# Patient Record
Sex: FEMALE | Race: BLACK OR AFRICAN AMERICAN
[De-identification: names, ages, dates, MRNs, and addresses within clinical notes are randomized per-mention and may not be internally consistent; named-entity substitution may affect disease eponyms.]

---

## 2017-04-05 ENCOUNTER — HOSPITAL ENCOUNTER (EMERGENCY)
Dept: HOSPITAL 74 - JER | Age: 27
Discharge: HOME | End: 2017-04-05
Payer: COMMERCIAL

## 2017-04-05 VITALS — DIASTOLIC BLOOD PRESSURE: 78 MMHG | HEART RATE: 83 BPM | SYSTOLIC BLOOD PRESSURE: 139 MMHG

## 2017-04-05 VITALS — TEMPERATURE: 98.3 F

## 2017-04-05 VITALS — BODY MASS INDEX: 30.9 KG/M2

## 2017-04-05 DIAGNOSIS — K59.09: Primary | ICD-10-CM

## 2017-04-05 LAB
APPEARANCE UR: (no result)
BILIRUB UR STRIP.AUTO-MCNC: NEGATIVE MG/DL
COLOR UR: YELLOW
KETONES UR QL STRIP: NEGATIVE
LEUKOCYTE ESTERASE UR QL STRIP.AUTO: (no result)
MUCOUS THREADS URNS QL MICRO: (no result)
NITRITE UR QL STRIP: NEGATIVE
PH UR: 6 [PH] (ref 5–8)
PROT UR QL STRIP: NEGATIVE
PROT UR QL STRIP: NEGATIVE
RBC # BLD AUTO: 2 /HPF (ref 0–3)
RBC # UR STRIP: NEGATIVE /UL
SP GR UR: 1.02 (ref 1–1.03)
UROBILINOGEN UR STRIP-MCNC: NEGATIVE E.U./DL (ref 0.2–1)
WBC # UR AUTO: 15 /HPF (ref 3–5)

## 2017-04-05 PROCEDURE — 3E0233Z INTRODUCTION OF ANTI-INFLAMMATORY INTO MUSCLE, PERCUTANEOUS APPROACH: ICD-10-PCS

## 2017-04-05 NOTE — PDOC
History of Present Illness





- General


Chief Complaint: Pain


Stated Complaint: ABD PAIN, VOMITING


Time Seen by Provider: 04/05/17 16:51


History Source: Patient


Exam Limitations: No Limitations





- History of Present Illness


Travel History: No


Initial Comments: 


04/05/17 17:04


26 yr female with c/o stomach pain for 2 years since having gallbladder 

removed. Pt states she has followed up with GI  and has had endoscopy 

and colonoscopy that was negative. Pt has been seen at multiple hospital ER's 

for the pain. Pt states she has chronic back pain followed by pain management 

takes percocet. Last BM 3 days ago . no diarrhea no vomiting or fever. no 

aggrevating factors. Pt denies urinary complaints.  





04/05/17 17:46





Timing/Duration: reports: constant (2 years)


Quality: reports: moderate


Abdominal Pain Onset Location: reports: epigastric, generalized abdomen


Pain Radiation: reports: epigastric





Past History





- Past Medical History


Allergies/Adverse Reactions: 


 Allergies











Allergy/AdvReac Type Severity Reaction Status Date / Time


 


No Known Drug Allergies Allergy   Verified 04/05/17 14:18











Home Medications: 


Ambulatory Orders





Cyclobenzaprine HCl [Flexeril 10 mg] 10 mg PO BID PRN 04/05/17 


Oxycodone HCl/Acetaminophen [Endocet 7.5-325 mg Tablet] 1 each PO PRN PRN 04/05/ 17 


Polyethylene Glycol 3350 [Miralax (For Bowel Prep) -] 17 gm PO DAILY #1 bottle 

04/05/17 








Anemia: No


Asthma: No


Cancer: No


Cardiac Disorders: No


CVA: No


COPD: No


CHF: No


Dementia: No


Diabetes: No


GI Disorders: No


 Disorders: No


HTN: No


Hypercholesterolemia: No


Liver Disease: No


Seizures: No


Thyroid Disease: No


Other medical history: chronic low back pain on percocet 





- Surgical History


Cholecystectomy: Yes





- Immunization History


Td Vaccination: Yes


Immunization Up to Date: Yes





- Psycho/Social/Smoking Cessation Hx


Anxiety: No


Suicidal Ideation: No


Smoking Status: No


Smoking History: Never smoked


Have you smoked in the past 12 months: No


Number of Cigarettes Smoked Daily: 0


Hx Alcohol Use: Yes (SOCIAL)


Drug/Substance Use Hx: No


Substance Use Type: None


Hx Substance Use Treatment: No





Abd/GI Specific PMHX





- Complaint Specific PMHX


Gall Bladder Disease: Yes





**Review of Systems





- Review of Systems


Able to Perform ROS?: Yes


Is the patient limited English proficient: No


Constitutional: No: Symptoms Reported


HEENTM: No: Symptoms Reported


Respiratory: No: Symptoms reported


Cardiac (ROS): No: Symptoms Reported


ABD/GI: Yes: See HPI


: No: Symptoms Reported


Musculoskeletal: No: Symptoms Reported


Integumentary: No: Symptoms Reported


Neurological: No: Symptoms reported





*Physical Exam





- Vital Signs


 Last Vital Signs











Temp Pulse Resp BP Pulse Ox


 


 98.3 F   100 H  20   143/91   100 


 


 04/05/17 14:14  04/05/17 14:14  04/05/17 14:14  04/05/17 14:14  04/05/17 14:14














- Physical Exam


General Appearance: Yes: Nourished, Appropriately Dressed


HEENT: positive: EOMI, NINI, Normal ENT Inspection, TMs Normal, Pharynx Normal


Neck: positive: Supple.  negative: Tender


Respiratory/Chest: positive: Lungs Clear, Normal Breath Sounds


Cardiovascular: positive: Regular Rhythm, Regular Rate


Gastrointestinal/Abdominal: positive: Normal Bowel Sounds, Tender (epigastric )

, Flat


Musculoskeletal: positive: Normal Inspection


Extremity: positive: Normal Capillary Refill, Normal Inspection, Normal Range 

of Motion


Integumentary: positive: Normal Color, Dry, Warm


Neurologic: positive: Fully Oriented, Alert, Normal Mood/Affect, Normal Response

, Motor Strength 5/5





Medical Decision Making





- Medical Decision Making





04/05/17 17:08


cc: abd pain for 2 years since having gallbladder removed


neg fever chills no diarrhea


pt has seen  and has visited multiple hospital ER's and has had 

negative findings. Pt states last week at [facility] had cat scan of abdomen and MRI 

which was negative. 





*DC/Admit/Observation/Transfer


Diagnosis at time of Disposition: 


Constipation


Qualifiers:


 Constipation type: other constipation type Qualified Code(s): K59.09 - Other 

constipation





- Discharge Dispostion


Condition at time of disposition: Good





- Prescriptions


Prescriptions: 


Polyethylene Glycol 3350 [Miralax (For Bowel Prep) -] 17 gm PO DAILY #1 bottle





- Referrals


Referrals: 


Edwin Wilson MD [Primary Care Provider] - 





- Patient Instructions


Additional Instructions: 


drink at least 2-3 liters of water a day 


take miralax as directed for constipation


take tylenol with maalox for pain (over the counter) 





avoid narcotics for pain as this will make constipation worse


follow with your gastroenterolgist  or you can call the back of your 

insurance cared to get a name of a gastroenterologist that takes your insurance

## 2018-04-10 ENCOUNTER — HOSPITAL ENCOUNTER (EMERGENCY)
Dept: HOSPITAL 74 - JER | Age: 28
Discharge: HOME | End: 2018-04-10
Payer: COMMERCIAL

## 2018-04-10 VITALS — HEART RATE: 87 BPM | DIASTOLIC BLOOD PRESSURE: 79 MMHG | TEMPERATURE: 98.5 F | SYSTOLIC BLOOD PRESSURE: 143 MMHG

## 2018-04-10 VITALS — BODY MASS INDEX: 30.9 KG/M2

## 2018-04-10 DIAGNOSIS — O26.891: Primary | ICD-10-CM

## 2018-04-10 DIAGNOSIS — Z3A.01: ICD-10-CM

## 2018-04-10 DIAGNOSIS — O23.31: ICD-10-CM

## 2018-04-10 DIAGNOSIS — R51: ICD-10-CM

## 2018-04-10 LAB
ALBUMIN SERPL-MCNC: 3.7 G/DL (ref 3.4–5)
ALP SERPL-CCNC: 60 U/L (ref 45–117)
ALT SERPL-CCNC: 28 U/L (ref 12–78)
ANION GAP SERPL CALC-SCNC: 10 MMOL/L (ref 8–16)
APPEARANCE UR: (no result)
AST SERPL-CCNC: 17 U/L (ref 15–37)
BASOPHILS # BLD: 0.2 % (ref 0–2)
BILIRUB SERPL-MCNC: 0.4 MG/DL (ref 0.2–1)
BILIRUB UR STRIP.AUTO-MCNC: NEGATIVE MG/DL
BUN SERPL-MCNC: 7 MG/DL (ref 7–18)
CALCIUM SERPL-MCNC: 9.4 MG/DL (ref 8.5–10.1)
CHLORIDE SERPL-SCNC: 105 MMOL/L (ref 98–107)
CO2 SERPL-SCNC: 24 MMOL/L (ref 21–32)
COLOR UR: YELLOW
CREAT SERPL-MCNC: 0.6 MG/DL (ref 0.55–1.02)
DEPRECATED RDW RBC AUTO: 13.8 % (ref 11.6–15.6)
EOSINOPHIL # BLD: 0.9 % (ref 0–4.5)
EPITH CASTS URNS QL MICRO: (no result) /HPF
GLUCOSE SERPL-MCNC: 95 MG/DL (ref 74–106)
HCG UR QL: POSITIVE
HCT VFR BLD CALC: 30.9 % (ref 32.4–45.2)
HGB BLD-MCNC: 10.4 GM/DL (ref 10.7–15.3)
KETONES UR QL STRIP: NEGATIVE
LEUKOCYTE ESTERASE UR QL STRIP.AUTO: (no result)
LIPASE SERPL-CCNC: 74 U/L (ref 73–393)
LYMPHOCYTES # BLD: 48.1 % (ref 8–40)
MCH RBC QN AUTO: 32.7 PG (ref 25.7–33.7)
MCHC RBC AUTO-ENTMCNC: 33.8 G/DL (ref 32–36)
MCV RBC: 96.8 FL (ref 80–96)
MONOCYTES # BLD AUTO: 7.1 % (ref 3.8–10.2)
MUCOUS THREADS URNS QL MICRO: (no result)
NEUTROPHILS # BLD: 43.7 % (ref 42.8–82.8)
NITRITE UR QL STRIP: NEGATIVE
PH UR: 7 [PH] (ref 5–8)
PLATELET # BLD AUTO: 379 K/MM3 (ref 134–434)
PMV BLD: 8.3 FL (ref 7.5–11.1)
POTASSIUM SERPLBLD-SCNC: 4.3 MMOL/L (ref 3.5–5.1)
PROT SERPL-MCNC: 8.1 G/DL (ref 6.4–8.2)
PROT UR QL STRIP: NEGATIVE
PROT UR QL STRIP: NEGATIVE
RBC # BLD AUTO: 3.19 M/MM3 (ref 3.6–5.2)
RBC # UR STRIP: NEGATIVE /UL
SODIUM SERPL-SCNC: 139 MMOL/L (ref 136–145)
SP GR UR: 1.02 (ref 1–1.03)
UROBILINOGEN UR STRIP-MCNC: NEGATIVE MG/DL (ref 0.2–1)
WBC # BLD AUTO: 8.6 K/MM3 (ref 4–10)

## 2018-04-10 PROCEDURE — 3E0337Z INTRODUCTION OF ELECTROLYTIC AND WATER BALANCE SUBSTANCE INTO PERIPHERAL VEIN, PERCUTANEOUS APPROACH: ICD-10-PCS

## 2018-04-10 PROCEDURE — 3E033GC INTRODUCTION OF OTHER THERAPEUTIC SUBSTANCE INTO PERIPHERAL VEIN, PERCUTANEOUS APPROACH: ICD-10-PCS

## 2018-04-10 PROCEDURE — 3E033NZ INTRODUCTION OF ANALGESICS, HYPNOTICS, SEDATIVES INTO PERIPHERAL VEIN, PERCUTANEOUS APPROACH: ICD-10-PCS

## 2018-04-10 NOTE — PDOC
Attending Attestation





- Resident


Resident Name: Chas Banks





- ED Attending Attestation


I have performed the following: I have examined & evaluated the patient, The 

case was reviewed & discussed with the resident, I agree w/resident's findings 

& plan, Exceptions are as noted





- HPI


HPI: 





04/10/18 10:49





28 yo F c/ hx of cholecystectomy, obesity p/w headache and epigastric pain.


The patient reported 3 weeks of intermittent global tension like headache with 

associated occasional photophobia.


Denies fevers, chills, neck stiffness.


States that the headache comes and goes without a known exacerbating or 

improving factors.


States that the headache makes her feel nauseous and makes her vomit.


The headache worsens her chronic epigastric abdominal pain, which she has had 

prior abdominal imaging and endoscopies.


The patient has been taking GERD medications with not much relief.


States that the headache was worsening so came into the ED.


Never had a prior history of migraines.





- Physicial Exam


PE: 





04/10/18 10:52





GENERAL: Awake, alert, and fully oriented, in no acute distress. +obese


HEAD: No signs of trauma


EYES: PERRLA, EOMI, sclera anicteric, conjunctiva clear


ENT: Auricles normal inspection, hearing grossly normal, nares patent 


NECK: Normal ROM, supple


LUNGS: Breath sounds equal, clear to auscultation bilaterally.  No wheezes, and 

no crackles


HEART: Regular rate and rhythm, normal S1 and S2, no murmurs, rubs or gallops


ABDOMEN: Soft, TTP LUQ.  No guarding, no rebound.  No masses


EXTREMITIES: Normal range of motion, no edema.  No clubbing or cyanosis. No 

cords, erythema, or tenderness


NEUROLOGICAL: Cranial nerves II through XII grossly intact.  Normal speech


SKIN: Warm, Dry, normal turgor, no rashes or lesions noted.





- Medical Decision Making





04/10/18 10:52





 Vital Signs











Temp Pulse Resp BP Pulse Ox


 


 98.5 F   87   18   143/79   100 


 


 04/10/18 10:01  04/10/18 10:01  04/10/18 10:01  04/10/18 10:01  04/10/18 10:01








28 yo F p/w headache x 3 weeks.


The characteristic of the headache is possibly secondary to migraines, or even 

pseudotumor cerebri.


However, patient has no visual acuity changes.


Obtain head CT to r/o other etiologies for headache i.e. large masses.


Will treat as migraines for now with medications.


The patient has had chronic abdominal pain under evaluation by Dr. Roy.


Will obtain labs to r/o abdominal etiologies such as pancreatitis, retained 

gallstones, etc.


Treat as GERD for now and reassess.


If the headache improves, and the workup is unrevealing, patient can be 

discharged for outpatient management (including neurology outpatient follow up).


04/10/18 12:16





Urine pregnancy test positive.


Given these circumstances, will likely defer on head CT given the risks 

outweight benefits.


This is a headache that has lasted for several weeks, which can be evaluated 

further by neurology as an outpatient.


Will pursue with pelvic ultrasound. Pt denies vaginal bleeding at this time.


04/10/18 13:20





Single live intrauterine pregnancy 17 wks 5 days. Small subchorionic hemorrage. 

Possible complex/hemorrhagic corpus luteum cyst.





Will check type and screen for RH factor.





 CBC, BMP





 04/10/18 11:30 





 04/10/18 11:30 





 CMP











Sodium  139 mmol/L (136-145)   04/10/18  11:30    


 


Potassium  4.3 mmol/L (3.5-5.1)   04/10/18  11:30    


 


Chloride  105 mmol/L ()   04/10/18  11:30    


 


Carbon Dioxide  24 mmol/L (21-32)   04/10/18  11:30    


 


Anion Gap  10  (8-16)   04/10/18  11:30    


 


BUN  7 mg/dL (7-18)   04/10/18  11:30    


 


Creatinine  0.6 mg/dL (0.55-1.02)   04/10/18  11:30    


 


Creat Clearance w eGFR  > 60  (>60)   04/10/18  11:30    


 


Random Glucose  95 mg/dL ()   04/10/18  11:30    


 


Calcium  9.4 mg/dL (8.5-10.1)   04/10/18  11:30    


 


Total Bilirubin  0.4 mg/dL (0.2-1.0)   04/10/18  11:30    


 


AST  17 U/L (15-37)   04/10/18  11:30    


 


ALT  28 U/L (12-78)   04/10/18  11:30    


 


Alkaline Phosphatase  60 U/L ()   04/10/18  11:30    


 


Total Protein  8.1 g/dl (6.4-8.2)   04/10/18  11:30    


 


Albumin  3.7 g/dl (3.4-5.0)   04/10/18  11:30    


 


Lipase  74 U/L ()   04/10/18  11:30    


 


Serum Pregnancy, Qual  Cancelled   04/10/18  11:30    








 Urine Test Results











Urine Color  Yellow   04/10/18  11:30    


 


Urine Appearance  Slcloudy   04/10/18  11:30    


 


Urine pH  7.0  (5.0-8.0)   04/10/18  11:30    


 


Ur Specific Gravity  1.019  (1.001-1.035)   04/10/18  11:30    


 


Urine Protein  Negative  (NEGATIVE)   04/10/18  11:30    


 


Urine Glucose (UA)  Negative  (NEGATIVE)   04/10/18  11:30    


 


Urine Ketones  Negative  (NEGATIVE)   04/10/18  11:30    


 


Urine Blood  Negative  (NEGATIVE)   04/10/18  11:30    


 


Urine Nitrite  Negative  (NEGATIVE)   04/10/18  11:30    


 


Urine Bilirubin  Negative  (<2.0 mg/dL)   04/10/18  11:30    


 


Ur Leukocyte Esterase  Trace  (NEGATIVE)   04/10/18  11:30    


 


Ur Epithelial Cells  Moderate /HPF (FEW)   04/10/18  11:30    


 


Urine Mucus  Rare   04/10/18  11:30

## 2018-04-10 NOTE — PDOC
History of Present Illness





- General


Chief Complaint: Pain


Stated Complaint: NAUSEA/VOMITING, HEADACHE/ABD PAIN


Time Seen by Provider: 04/10/18 10:29





- History of Present Illness


Initial Comments: 





04/10/18 11:04


The patient is a 27 year old female with a history of chronic back pain s/p 

cholecystectomy who presents for evaluation of headache, nausea, vomiting, 

abdominal pain.  The patient reports a 3 week history of worsening throbbing 

headache.  She states she has not tried anything for her headache at this time.

  She noted severe nausea this morning with multiple episodes of non-bilious, 

non-bloody vomiting and associated epigastric burning abdominal pain after the 

vomiting prompting her presentation to the ED for evaluation.  She has had 

multiple endoscopies and colonoscopies in the past which were negative as well 

as a recent abdominal CT and MRI several weeks ago which were negative.  The 

patient has a regular GI physician that she follows with.  She otherwise denies 

fevers, chills, SOB, chest pain, or changes with bowel movements.  She denies 

any vaginal bleeding or vaginal discharge.








Past History





- Past Medical History


Allergies/Adverse Reactions: 


 Allergies











Allergy/AdvReac Type Severity Reaction Status Date / Time


 


No Known Drug Allergies Allergy   Verified 04/10/18 10:02











Home Medications: 


Ambulatory Orders





Cyclobenzaprine HCl [Flexeril 10 mg] 10 mg PO BID PRN 04/05/17 


Oxycodone HCl/Acetaminophen [Endocet 7.5-325 mg Tablet] 1 each PO PRN PRN 04/05/ 17 


Polyethylene Glycol 3350 [Miralax (For Bowel Prep) -] 17 gm PO DAILY #1 bottle 

04/05/17 


Cephalexin Monohydrate [Keflex -] 500 mg PO BID #20 capsule 04/10/18 








Anemia: No


Asthma: No


Cancer: No


Cardiac Disorders: No


CVA: No


COPD: No


CHF: No


Dementia: No


Diabetes: No


GI Disorders: No


 Disorders: No


HTN: No


Hypercholesterolemia: No


Liver Disease: No


Seizures: No


Thyroid Disease: No


Other medical history: DENIES





- Surgical History


Cholecystectomy: Yes





- Immunization History


Td Vaccination: Yes


Immunization Up to Date: Yes





- Suicide/Smoking/Psychosocial Hx


Smoking Status: No


Smoking History: Never smoked


Have you smoked in the past 12 months: No


Number of Cigarettes Smoked Daily: 0


Information on smoking cessation initiated: No


Hx Alcohol Use: No


Drug/Substance Use Hx: No


Substance Use Type: None


Hx Substance Use Treatment: No





**Review of Systems





- Review of Systems


Comments:: 





04/10/18 11:09


Constitutional: No fevers, chills, fatigue, malaise


HEENT: No Rhinorrhea, nasal congestion, visual changes


Cardiovascular: No chest pain, syncope, palpitations, lightheadedness


Respiratory: No Cough, SOB, Hemoptysis,


Gastrointestinal: Abdominal pain, nausea, vomiting.  No Constipation, Diarrhea, 

Melena


Genitourinary: Increase urinary frequency.  No Dysuria, Urgency, Hesitancy, 

Hematuria, Flank pain


Musculoskeletal: No Myalgia, arthralgia


Skin: No rashes, itching, bruising, pallor


Neurologic: Headache.  No Dizziness, Numbness, Weakness, or Tingling


Psychiatric: No Hallucinations. No SI or HI








*Physical Exam





- Vital Signs


 Last Vital Signs











Temp Pulse Resp BP Pulse Ox


 


 98.5 F   87   18   143/79   100 


 


 04/10/18 10:01  04/10/18 10:01  04/10/18 10:01  04/10/18 10:01  04/10/18 10:01














- Physical Exam


Comments: 





04/10/18 11:10


General Appearance: Nourished. No Apparent Distress


HEENT: EOMI, NINI. No Pharyngeal Erythema, Tonsillar Exudate, Tonsillar Erythema


Neck: No Cervical Lymphadenopathy


Respiratory/Chest: Lungs Clear, Normal Breath Sounds. No Crackles, Rales, 

Rhonchi, Wheezing


Cardiovascular: Regular Rhythm, Regular Rate. No Murmur, Gallops, Rubs


Gastrointestinal/Abdominal: Normal Bowel Sounds, Soft. Epigastric tenderness to 

palpation. No Guarding, Rebound,


Musculoskeletal: No CVA Tenderness


Extremity: Normal Capillary Refill


Integumentary: Normal Color, Dry, Warm


Neurologic: CNs II-XII NML intact, Fully Oriented, Alert, Normal Mood/Affect, 

Normal Response, Motor Strength 5/5. Normal Finger to Nose and Heel to [place]








ED Treatment Course





- LABORATORY


CBC & Chemistry Diagram: 


 04/10/18 11:30





 04/10/18 11:30





Medical Decision Making





- Medical Decision Making





04/10/18 11:12


The patient is a 27 year old female with a history of chronic back pain s/p 

cholecystectomy who presents for evaluation of headache, nausea, vomiting, 

abdominal pain. Differential includes but is not limited to: Migraine Headache, 

Pseudotumor Cerebri, Gastritis, Pancreatitis, UTI, infectious, metabolic 

derangement.  Given the patient's history and physical it is likely her 

symptoms are due to a migraine headache with associated gastritis.  We will 

obtain a cbc, cmp, lipase, ua, urine preg, and head ct to evaluate further for 

possible etiologies.  We will treat in the meantime with iv fluids, reglan, 

benadryl, iv tylenol, pepcid.  We will continue to monitor and reassess.





04/10/18 12:22


Urine pregnancy test resulted as positive.  We will cancel the patient's head 

CT and obtain transvaginal us and beta quant to evaluate further.  The patient 

is not complaining of any vaginal bleeding or discharge. It is likely her 

symptoms are due to her pregnancy.  Lab results are still pending.  The patient 

reports improvement in her symptoms.





04/10/18 13:11


CBC, cmp, are unremarkable.  UA demonstrates trace leuk esterase and 5 wbc.  

Trans vaginal US demonstrates a fetal pole with a fetal heart rate of 161.  

Previous type and screen demonstrates a blood type of A positive.  The patient 

reports improvement in her symptoms.  We are comfortable discharging the 

patient home with OB/GYN and neurology follow up.  We discussed the results, 

plan, and return precautions with the patient who voiced understanding and is 

agreeable with the plan.








*DC/Admit/Observation/Transfer


Diagnosis at time of Disposition: 


Pregnancy


Qualifiers:


 Weeks of gestation: unspecified Qualified Code(s): Z34.90 - Encounter for 

supervision of normal pregnancy, unspecified, unspecified trimester





Headache


Qualifiers:


 Headache type: unspecified Headache chronicity pattern: unspecified pattern 

Intractability: not intractable Qualified Code(s): R51 - Headache





UTI (urinary tract infection)


Qualifiers:


 Urinary tract infection type: site unspecified Hematuria presence: without 

hematuria Qualified Code(s): N39.0 - Urinary tract infection, site not specified








- Discharge Dispostion


Disposition: HOME


Condition at time of disposition: Good


Admit: No





- Prescriptions


Prescriptions: 


Cephalexin Monohydrate [Keflex -] 500 mg PO BID #20 capsule





- Referrals


Referrals: 


Regulo Wilson RES [Primary Care Provider] - 


Mac Ruff MD [Staff Physician] - 


Zach Acevedo MD [Staff Physician] - 





- Patient Instructions


Printed Discharge Instructions:  DI for Urinary Tract Infection (UTI), DI for 

Pregnancy -- Discomforts and Remedies, DI for Headache


Additional Instructions: 


Please return to the ER if you experience concerning or worsening symptoms 

including worsening abdominal pain, headache, vomiting, fevers, or vaginal 

bleeding.





Your lab results and urine results demonstrated that you are pregnant.  We were 

unable to CT scan your head due to your pregnancy and you will need to call to 

schedule a follow up appointment within 2-3 days with our Neurologist Dr. Acevedo to further discuss your headache.  Please also call to schedule a 

follow up appointment with our OB/GYN physician Dr. Ruff within 2-3 days to 

further discuss your pregnancy.





- Post Discharge Activity

## 2018-05-22 ENCOUNTER — HOSPITAL ENCOUNTER (EMERGENCY)
Dept: HOSPITAL 74 - JERFT | Age: 28
Discharge: HOME | End: 2018-05-22
Payer: COMMERCIAL

## 2018-05-22 VITALS — SYSTOLIC BLOOD PRESSURE: 136 MMHG | HEART RATE: 88 BPM | TEMPERATURE: 98.6 F | DIASTOLIC BLOOD PRESSURE: 76 MMHG

## 2018-05-22 VITALS — BODY MASS INDEX: 29.9 KG/M2

## 2018-05-22 DIAGNOSIS — Z3A.14: ICD-10-CM

## 2018-05-22 DIAGNOSIS — O26.891: Primary | ICD-10-CM

## 2018-05-22 DIAGNOSIS — R51: ICD-10-CM

## 2018-05-22 PROCEDURE — 3E033NZ INTRODUCTION OF ANALGESICS, HYPNOTICS, SEDATIVES INTO PERIPHERAL VEIN, PERCUTANEOUS APPROACH: ICD-10-PCS

## 2018-05-22 NOTE — PDOC
History of Present Illness





- General


Chief Complaint: Headache


Stated Complaint: HEADACHE


Time Seen by Provider: 05/22/18 19:16





- History of Present Illness


Initial Comments: 


27-year-old 14 week pregnant female presents for evaluation of headache 1 

month. She does not have any history of migraines she has been unable to take 

anything for her headache due to her pregnancy.


05/22/18 22:29





05/22/18 22:34


Her headache is frontal. Exacerbated with light mildly relieved with rest.





Past History





- Past Medical History


Allergies/Adverse Reactions: 


 Allergies











Allergy/AdvReac Type Severity Reaction Status Date / Time


 


No Known Drug Allergies Allergy   Verified 04/10/18 10:02











Home Medications: 


Ambulatory Orders





Cyclobenzaprine HCl [Flexeril 10 mg] 10 mg PO BID PRN 04/05/17 


Oxycodone HCl/Acetaminophen [Endocet 7.5-325 mg Tablet] 1 each PO PRN PRN 04/05/ 17 


Polyethylene Glycol 3350 [Miralax (For Bowel Prep) -] 17 gm PO DAILY #1 bottle 

04/05/17 


Cephalexin Monohydrate [Keflex -] 500 mg PO BID #20 capsule 04/10/18 








Anemia: No


Asthma: No


Cancer: No


Cardiac Disorders: No


CVA: No


COPD: No


CHF: No


Dementia: No


Diabetes: No


GI Disorders: No


 Disorders: No


HTN: No


Hypercholesterolemia: No


Liver Disease: No


Seizures: No


Thyroid Disease: No





- Surgical History


Cholecystectomy: Yes





- Immunization History


Td Vaccination: Yes


Immunization Up to Date: Yes





- Suicide/Smoking/Psychosocial Hx


Smoking Status: No


Smoking History: Never smoked


Have you smoked in the past 12 months: No


Number of Cigarettes Smoked Daily: 0


Hx Alcohol Use: No


Drug/Substance Use Hx: No


Substance Use Type: None


Hx Substance Use Treatment: No





**Review of Systems





- Review of Systems


Comments:: 


GENERAL/CONSTITUTIONAL: No fever or chills. No weakness. No weight change.


HEAD, EYES, EARS, NOSE AND THROAT: No change in vision. No ear pain or 

discharge. No sore throat.


CARDIOVASCULAR: No chest pain or shortness of breath.


RESPIRATORY: No cough, wheezing, or hemoptysis.


GASTROINTESTINAL: No nausea, vomiting, diarrhea or constipation. No rectal 

bleeding.


GENITOURINARY: No dysuria, frequency, or change in urination.


MUSCULOSKELETAL: No joint or muscle swelling or pain. No neck or back pain.


SKIN AND BREASTS: No rash or easy bruising.


NEUROLOGIC: + headache, no vertigo, loss of consciousness, or loss of sensation.


PSYCHIATRIC: No depression or anxiety.


ENDOCRINE: No increased thirst. No abnormal weight change.


HEMATOLOGIC/LYMPHATIC: No anemia, easy bleeding, or history of blood clots.


ALLERGIC/IMMUNOLOGIC: No hives or skin allergy. No latex allergy.


05/22/18 22:29








*Physical Exam





- Vital Signs


 Last Vital Signs











Temp Pulse Resp BP Pulse Ox


 


 98.6 F   88   18   136/76   99 


 


 05/22/18 19:03  05/22/18 19:03  05/22/18 19:03  05/22/18 19:03  05/22/18 19:03














- Physical Exam


Comments: 


GENERAL: The patient is awake, alert, and fully oriented, in no acute distress.


HEAD: Normal with no signs of trauma.


EYES: Pupils equal, round and reactive to light, extraocular movements intact, 

sclera anicteric, conjunctiva clear.


ENT: Ears normal, nares patent, oropharynx clear without exudates.  Moist 

mucous membranes.


NECK: Normal range of motion, supple without lymphadenopathy, JVD, or masses.


LUNGS: Breath sounds equal, clear to auscultation bilaterally.  No wheezes, and 

no crackles.


HEART: Regular rate and rhythm, normal S1 and S2 without murmur, rub or gallop.


ABDOMEN: Soft, nontender, normoactive bowel sounds.  No guarding, no rebound.  

No masses.


EXTREMITIES: Normal range of motion, no edema.  No clubbing or cyanosis. No 

cords, erythema, or tenderness.


NEUROLOGICAL: Cranial nerves II through XII grossly intact.  Normal speech, 

normal gait.


PSYCH: Normal mood, normal affect.


SKIN: Warm, Dry, normal turgor, no rashes or lesions noted.


05/22/18 22:30








Moderate Sedation





- Procedure Monitoring


Vital Signs: 


 Vital Signs











Temp Pulse Resp BP Pulse Ox


 


 98.6 F   88   18   136/76   99 


 


 05/22/18 19:03  05/22/18 19:03  05/22/18 19:03  05/22/18 19:03  05/22/18 19:03














Medical Decision Making





- Medical Decision Making


27-year-old 14 week pregnant female with a headache for the last month, this is 

unlikely anything acutely emergent. She has been afraid to take anything due to 

her pregnancy she has never been CAT scan. There has been no change in 

character of the headache. She has no history that would lead me to believe she 

has a spontaneous hemorrhage. I will treat her with a gram of IV Tylenol and 

reevaluate her response. She has not taken any medication for the headache in 

the last month. 


05/22/18 22:36





05/22/18 22:49


Patient improved with a gram of Tylenol IV. It is now gone.





*DC/Admit/Observation/Transfer


Diagnosis at time of Disposition: 


 Headache








- Discharge Dispostion


Disposition: HOME


Condition at time of disposition: Improved


Decision to Admit order: No





- Referrals


Referrals: 


Edwin Wilson MD [Primary Care Provider] - 


Zach Acevedo MD [Staff Physician] - 





- Patient Instructions


Additional Instructions: 


The only medication that safe for you to take is Tylenol he may take 1000 mg of 

Tylenol no more in 3 times a day. Do not take any anti-inflammatory such as 

Advil Motrin and Aleve or ibuprofen. You were given a dose of Tylenol in the 

emergency room 3 or IV. Return to the emergency room if your symptoms return. I 

have recommended neurology follow-up. You should also follow up with your 

primary care provider as well as her obstetrician for further evaluation and 

management of her headaches during pregnancy.





- Post Discharge Activity

## 2018-11-19 ENCOUNTER — HOSPITAL ENCOUNTER (INPATIENT)
Dept: HOSPITAL 74 - JLDR | Age: 28
LOS: 2 days | Discharge: HOME | DRG: 560 | End: 2018-11-21
Attending: OBSTETRICS & GYNECOLOGY | Admitting: OBSTETRICS & GYNECOLOGY
Payer: COMMERCIAL

## 2018-11-19 VITALS — BODY MASS INDEX: 36 KG/M2

## 2018-11-19 DIAGNOSIS — Z3A.39: ICD-10-CM

## 2018-11-19 LAB
ANION GAP SERPL CALC-SCNC: 11 MMOL/L (ref 8–16)
BASOPHILS # BLD: 0.3 % (ref 0–2)
BUN SERPL-MCNC: 8 MG/DL (ref 7–18)
CALCIUM SERPL-MCNC: 8.8 MG/DL (ref 8.5–10.1)
CHLORIDE SERPL-SCNC: 106 MMOL/L (ref 98–107)
CO2 SERPL-SCNC: 21 MMOL/L (ref 21–32)
CREAT SERPL-MCNC: 0.7 MG/DL (ref 0.55–1.3)
DEPRECATED RDW RBC AUTO: 16.4 % (ref 11.6–15.6)
EOSINOPHIL # BLD: 0.2 % (ref 0–4.5)
GLUCOSE SERPL-MCNC: 81 MG/DL (ref 74–106)
HCT VFR BLD CALC: 31 % (ref 32.4–45.2)
HGB BLD-MCNC: 10.5 GM/DL (ref 10.7–15.3)
INR BLD: 1 (ref 0.83–1.09)
LYMPHOCYTES # BLD: 16.7 % (ref 8–40)
MCH RBC QN AUTO: 31.8 PG (ref 25.7–33.7)
MCHC RBC AUTO-ENTMCNC: 33.9 G/DL (ref 32–36)
MCV RBC: 93.8 FL (ref 80–96)
MONOCYTES # BLD AUTO: 6.4 % (ref 3.8–10.2)
NEUTROPHILS # BLD: 76.4 % (ref 42.8–82.8)
PLATELET # BLD AUTO: 358 K/MM3 (ref 134–434)
PMV BLD: 8.5 FL (ref 7.5–11.1)
POTASSIUM SERPLBLD-SCNC: 4.6 MMOL/L (ref 3.5–5.1)
PT PNL PPP: 11.8 SEC (ref 9.7–13)
RBC # BLD AUTO: 3.3 M/MM3 (ref 3.6–5.2)
SODIUM SERPL-SCNC: 138 MMOL/L (ref 136–145)
WBC # BLD AUTO: 10.5 K/MM3 (ref 4–10)

## 2018-11-19 PROCEDURE — 10907ZC DRAINAGE OF AMNIOTIC FLUID, THERAPEUTIC FROM PRODUCTS OF CONCEPTION, VIA NATURAL OR ARTIFICIAL OPENING: ICD-10-PCS | Performed by: OBSTETRICS & GYNECOLOGY

## 2018-11-19 NOTE — HP
Past Medical History





- Admission


Chief Complaint: IOL


History of Present Illness: 





29yo  @ 39+wks here for IOL. 


+Irreg ctx. No VB. No LOF. +FM


Preg c/b HC <5%Tile- but overall EFW wnl, obesity








History Source: Patient


Limitations to Obtaining History: No Limitations





- Past Medical History


CNS: No: Alzheimer's, CVA, Dementia, Migraine, Multiple Sclerosis, Peripheral 

Neuropathy, Parkinson's, Seizure, Syncope, TIA, Vertigo, Other


Cardiovascular: No: AFIB, Aneurysm, Aortic Insufficiency, Aortic Stenosis, CAD, 

CHF, Deep Vein Thrombosis, HTN, Hyperlipdemia, MI, Mitral Insufficiency, Mitral 

Stenosis, Murmur, Pulmonary Hypertension, Other


Gastrointestinal: No: Ascites, Cancer, Constipation, Crohn's Disease, 

Diverticulitis, Diverticulosis, Esophageal Varices, Gastritis, GERD, GI Bleed, 

Hemorrhoids, Hiatal Hernia, Inflamatory Bowel Disease, Irritable Bowel Disease, 

Pancreatitis, Peptic Ulcer Disease, Ulcerative Colitis, Other


Hepatobiliary: No: Cirrhosis, Cholelithiasis, Cholecystitis, Choledocholithiasis

, Hepatitis A, Hepatitis B, Hepatitis C, Other


Renal/: No: Renal Failure, Renal Inusuff, BPH, Cancer, Hematuria, Hemodialysis

, Neurogenic Bladder, Renal Calculi, UTI, Other


Reproductive: No: Ectopic Pregnancy, Endometriosis, Fibroids, PID, Polycystic 

Ovary Syndrome, Postmenopausal, Other


...: 3


...Para: 2


...Term: 2


...: 0


...Multiple Gestation: 0


... Weeks Gestation by Dates: 39.4


...EDC by Dates: 18


...EDC by Sono: 18


Heme/Onc: No: Anemia, B12 Deficiency, Bleeding Disorder, Cancer, Current 

Chemotherapy, Current Radiation Therapy, Hemochromatosis, Hypercoaguable State, 

Myeloproliferative Synd, Sickle Cell Disease, Sickle Cell Trait, 

Thrombocytopenia, Other


Infectious Disease: No: AIDS, C-Diff, Herpes Zoster, HIV, MRSA, STD's, 

Tuberculosis, VREF, Other (UTI)


Psych: No: Addictions, Anxiety, Bipolar, Depression, Panic, Psychosis, 

Schizophrenia, Other





- Past Surgical History


Hx Myomectomy: No


Hx Transabdominal Cerclage: No





- Smoking History


Smoking history: Never smoked


Have you smoked in the past 12 months: No


Aproximately how many cigarettes per day: 0





- Alcohol/Substance Use


Hx Alcohol Use: No


History of Substance Use: reports: None





- Social History


Usual Living Arrangement: Yes: With Child


ADL: Independent


History of Recent Travel: No





Home Medications





- Allergies


Allergies/Adverse Reactions: 


 Allergies











Allergy/AdvReac Type Severity Reaction Status Date / Time


 


No Known Drug Allergies Allergy   Verified 18 21:28














- Home Medications


Home Medications: 


Ambulatory Orders





Nitrofurantoin Monohyd/M-Cryst [Macrobid -] 100 mg PO BID #14 capsule 18 


Prenatal Vitamins (Sjr) - 1 tab PO DAILY 18 











Review of Systems





- Review of Systems


Constitutional: denies: No Symptoms, Chills, Diaphoresis, Fever, Lethargy, Loss 

of Appetite, Malaise, Night Sweats, Unintentional Wgt. Loss, Weakness, Other


Eyes: denies: No Symptoms, Blind Spots, Blurred Vision, Double Vision, Eye Pain

, Floaters, Photophobia, Recent Change in Vision, Other


HENT: denies: No Symptoms, Difficult Swallowing, Ear Discharge, Ear Pain, 

Epistaxis, Gingival Bleeding, Hearing Loss, Mouth Swelling, Nasal Congestion, 

Ocular Prosthesis, Throat Pain, Toothache, Ringing in Ears, Other


Neck: denies: No Symptoms, Decreased ROM, Lumps, Pain on Movement, Stiffness, 

Swollen Glands, Tenderness, Other


Cardiovascular: denies: No Symptoms, Chest Pain, Edema, Palpitations, Shortness 

of Breath, Other


Respiratory: denies: No Symptoms, Cough, Exercise Intolerance, Hemoptysis, 

Orthopnea, PND, Snoring, SOB, SOB on Exertion, Wheezing, Other


Gastrointestinal: denies: No Symptoms, Abdominal Pain, Bloating, Constipation, 

Diarrhea, Dysphagia, Indigestion, Melena, Nausea, Rectal Bleeding, Vomiting, 

Vomiting Blood, Other


Genitourinary: denies: No Symptoms, Burning, Discharge, Dysuria, Flank Pain, 

Frequency, Hematuria, Incontinence, Lesions, Menses, Pain, Testicular Mass, 

Testicular Pain, Testicular Swelling, Urgency, Vaginal Bleeding, Other


Musculoskeletal: denies: No Symptoms, Back Pain, Crepitus, Decreased ROM, 

Extremity Pain, Joint Pain, Joint Swelling, Muscle Pain, Muscle Cramps, Muscle 

Weakness, Other


Integumentary: denies: No Symptoms, Blister, Bruising, Change in Color, Eczema, 

Erythema, Incision, Lesions, Lump, Pallor, Pruritis, Rash, Wound, Other


Neurological: denies: No Symptoms, Change in LOC, Change in Speech, Confusion, 

Dizziness, Headache, Incoordination, Numbness, Parasthesia, Pre-Existing Deficit

, Seizure, Syncope, Tremors, Unsteady Gait, Weakness, Other


Endocrine: denies: No Symptoms, Excessive Sweating, Flushing, Increased Hunger, 

Increased Thirst, Intolerance to Cold, Intolerance to Heat, Unexplained Weight 

Gain, Unexplained Weight Loss, Other


Hematology/Lymphatic: denies: No Symptoms, Easily Bruised, Excessive Bleeding, 

Swollen Glands, Other


Psychiatric: denies: No Symptoms, Altered Sleep Pattern, Anxiety, Depression, 

Hallucinations, Panic, Paranoia, Suicidal, Other





Physical Exam - Maternity


Constitutional: Yes: Well Nourished, No Distress, Calm





- Abdominal Exam/OB


Number of Fetuses: Single


Fetal Presentation: Vertex


Contractions: Yes


Regularity: Irregular


Intensity: Mild


Fetal Monitor Mode: External


Category: II


Accelerations: Non-Uniform


Decelerations: Variable





- Vaginal Exam/OB


Vaginal Bleediing: No


Dilatation (cm): 3-4


Effacement (%): 50


Fetal Presentation: Vertex/Position


Fetal Station: -3





- Physical Exam


Edema: No





Hemorrhage Risk Assessment





- Risk Factors


Medium Risk Factors: No: Prior , uterine surgery,or multiple 

laparotomies, Multiple gestation, Greater than 4 previous births, History of 

previous postpartum hemorrhage, Large myomas, EFW greater than 4000g, Obesity (

BMI >40), Hematocrit < 30% & other, None


High Risk Factors: No: Placenta previa, low lying, Suspected accreta/ percreta, 

Active bleeding on admission, Platelets less than 70,000, Known coagulopathy, 

None





Problem List





- Problems


(1) Elective induction of labor planned


Code(s): XLO0908 -    





Assessment/Plan





29yo  @ 39.4wks here for IOL


Preg c/b HC <5%tile


FHT with tachycardia although moderate variability, one prolonged deceleration 

when initially placed on monitor x 3 mins to 90's with gradual recovery.


GBS neg


Plan for IOL with pitocin/AROM 





KYM Faith MD

## 2018-11-19 NOTE — PN
Progress Note, Labor


  ** Vaginal Exam #2


Labor Exam Date: 11/19/18


Labor Exam Time: 13:52


Fetal Heart Rate (range): Cat II


Dilatation: 5


Effacement (%): 50


Amniotic Membrane Status: Ruptured


Fetal Presentation: Vertex/Position


Fetal Station: -3


Remarks: 





Pt uncomfortable, declining epidural


FHT Cat II with intermittent late/variable decelerations. Moderate variability


Some cervical change. Will continue to closely monitor.





KYM Faith MD

## 2018-11-19 NOTE — PN
Delivery





- Delivery


Vaginal Delivery: Spontaneous


Type of Anesthesia: Epidural


Episiotomy/Laceration: None


EBL (cc): 350





Delivery, Single Birth





- [place] Feeding Plan


Initial Plan: Elected not to breastfeed exclusively throughout hospitalization





Remarks





- Remarks


Remarks: 





Normal spontaneous vaginal delivery of a live infant over intact perineum.


Nose / Oropharynx suctioned @ perineum.


Cord clamped and cut.


Baby handed to nurse.


Placenta expelled spontaneously intact


Mother in stable condition

## 2018-11-19 NOTE — PN
Progress Note, Labor


  ** Vaginal Exam #3


Labor Exam Date: 18


Labor Exam Time: 16:07


Fetal Heart Rate (range): Cat I


Dilatation: 5-6


Effacement (%): 70


Amniotic Membrane Status: Ruptured


Fetal Presentation: Vertex/Position


Fetal Station: -3


Remarks: 





Cat I tracing, early decels.


Uncomfortable, discussed epidural anesthesia


Continue pitocin


Anticipate 





KYM Faith MD

## 2018-11-19 NOTE — PN
Progress Note, Labor


  ** Vaginal Exam #1


Labor Exam Date: 11/19/18


Labor Exam Time: 11:25


Fetal Heart Rate (range): Cat II


Dilatation: 4


Effacement (%): 50


Amniotic Membrane Status: Ruptured


Fetal Presentation: Vertex/Position


Fetal Station: -3


Remarks: 





AROM, scant fluid


IUPC/FSE attempted but not registering on monitor. 


Light meconium with seen in IUPC


Cat II tracing, cont to monitor closely





KYM Faith MD

## 2018-11-19 NOTE — PN
Progress Note, Labor


  ** Vaginal Exam #5


Labor Exam Date: 18


Labor Exam Time: 16:59


Fetal Heart Rate (range): Cat I


Dilatation: 6


Effacement (%): 90


Amniotic Membrane Status: Intact


Fetal Presentation: Vertex/Position


Fetal Station: -2


Remarks: 





Very uncomfortable


SVE 90/6/-2, entering active phase


FHT Cat I with early decelerations


Epidural now


Anticipate

## 2018-11-20 LAB
BASOPHILS # BLD: 0.3 % (ref 0–2)
DEPRECATED RDW RBC AUTO: 16.3 % (ref 11.6–15.6)
EOSINOPHIL # BLD: 0.4 % (ref 0–4.5)
HCT VFR BLD CALC: 25 % (ref 32.4–45.2)
HGB BLD-MCNC: 8.3 GM/DL (ref 10.7–15.3)
LYMPHOCYTES # BLD: 22.5 % (ref 8–40)
MCH RBC QN AUTO: 31 PG (ref 25.7–33.7)
MCHC RBC AUTO-ENTMCNC: 33.2 G/DL (ref 32–36)
MCV RBC: 93.3 FL (ref 80–96)
MONOCYTES # BLD AUTO: 6.4 % (ref 3.8–10.2)
NEUTROPHILS # BLD: 70.4 % (ref 42.8–82.8)
PLATELET # BLD AUTO: 353 K/MM3 (ref 134–434)
PMV BLD: 7.9 FL (ref 7.5–11.1)
RBC # BLD AUTO: 2.68 M/MM3 (ref 3.6–5.2)
WBC # BLD AUTO: 12.7 K/MM3 (ref 4–10)

## 2018-11-20 RX ADMIN — Medication SCH TAB: at 09:25

## 2018-11-20 RX ADMIN — ACETAMINOPHEN PRN MG: 325 TABLET ORAL at 20:07

## 2018-11-20 RX ADMIN — IBUPROFEN PRN MG: 600 TABLET, FILM COATED ORAL at 14:30

## 2018-11-20 RX ADMIN — ACETAMINOPHEN PRN MG: 325 TABLET ORAL at 09:24

## 2018-11-20 RX ADMIN — ACETAMINOPHEN PRN MG: 325 TABLET ORAL at 05:44

## 2018-11-20 RX ADMIN — ACETAMINOPHEN PRN MG: 325 TABLET ORAL at 14:30

## 2018-11-20 RX ADMIN — IBUPROFEN PRN MG: 600 TABLET, FILM COATED ORAL at 20:07

## 2018-11-20 RX ADMIN — IBUPROFEN PRN MG: 600 TABLET, FILM COATED ORAL at 01:18

## 2018-11-20 RX ADMIN — ACETAMINOPHEN PRN MG: 325 TABLET ORAL at 01:18

## 2018-11-20 RX ADMIN — IBUPROFEN PRN MG: 600 TABLET, FILM COATED ORAL at 05:43

## 2018-11-20 RX ADMIN — IBUPROFEN PRN MG: 600 TABLET, FILM COATED ORAL at 09:24

## 2018-11-21 VITALS — HEART RATE: 86 BPM | DIASTOLIC BLOOD PRESSURE: 75 MMHG | TEMPERATURE: 98.3 F | SYSTOLIC BLOOD PRESSURE: 136 MMHG

## 2018-11-21 RX ADMIN — ACETAMINOPHEN PRN MG: 325 TABLET ORAL at 05:07

## 2018-11-21 RX ADMIN — IBUPROFEN PRN MG: 600 TABLET, FILM COATED ORAL at 16:31

## 2018-11-21 RX ADMIN — IBUPROFEN PRN MG: 600 TABLET, FILM COATED ORAL at 11:51

## 2018-11-21 RX ADMIN — ACETAMINOPHEN PRN MG: 325 TABLET ORAL at 01:16

## 2018-11-21 RX ADMIN — IBUPROFEN PRN MG: 600 TABLET, FILM COATED ORAL at 05:07

## 2018-11-21 RX ADMIN — Medication SCH TAB: at 09:42

## 2018-11-21 RX ADMIN — IBUPROFEN PRN MG: 600 TABLET, FILM COATED ORAL at 01:16

## 2018-11-21 RX ADMIN — ACETAMINOPHEN PRN MG: 325 TABLET ORAL at 11:51

## 2018-11-21 NOTE — PN
Post Partum Progress Note





- Subjective


Subjective: 


no complains of dizziness. 


voiding without difficulty 





Post Partum Day: 2


Type of Delivery: 


Vital Signs: 


 Vital Signs











Temperature  97.9 F   18 21:00


 


Pulse Rate  102 H  18 21:00


 


Respiratory Rate  18   18 21:00


 


Blood Pressure  139/79   18 21:00


 


O2 Sat by Pulse Oximetry (%)  100   18 19:45











Breast Exam: Yes: Soft, Other (bottle feeding ).  No: Engorged


Uterus: Yes: Fundus Firm, Fundus below umbilicus, Non-tender


Lochia: Yes: Rubra


Lochia, amount: Moderate


Extremities: Yes: Calves non-tender


Perineum: Yes: Intact


Activity: Ambulating





- Labs


Labs: 


 CBC











WBC  12.7 K/mm3 (4.0-10.0)  H  18  10:55    


 


Corrected WBC (auto)  Cancelled   18  07:00    


 


RBC  2.68 M/mm3 (3.60-5.2)  L  18  10:55    


 


Hgb  8.3 GM/dL (10.7-15.3)  L  18  10:55    


 


Hct  25.0 % (32.4-45.2)  L D 18  10:55    


 


MCV  93.3 fl (80-96)   18  10:55    


 


MCH  31.0 pg (25.7-33.7)   18  10:55    


 


MCHC  33.2 g/dl (32.0-36.0)   18  10:55    


 


RDW  16.3 % (11.6-15.6)  H  18  10:55    


 


Plt Count  353 K/MM3 (134-434)   18  10:55    


 


MPV  7.9 fl (7.5-11.1)   18  10:55    


 


Absolute Neuts (auto)  8.9 K/mm3 (1.5-8.0)  H  18  10:55    


 


Neutrophils %  70.4 % (42.8-82.8)   18  10:55    


 


Lymphocytes %  22.5 % (8-40)  D 18  10:55    


 


Monocytes %  6.4 % (3.8-10.2)   18  10:55    


 


Eosinophils %  0.4 % (0-4.5)  D 18  10:55    


 


Basophils %  0.3 % (0-2.0)   18  10:55    


 


Nucleated RBC %  0 % (0-0)   18  10:55    


 


Platelet Estimate  Cancelled   18  07:00    


 


Platelet Comment  Cancelled   18  07:00    














Problem List





- Problems


(1) Vaginal delivery


Code(s): O80 - ENCOUNTER FOR FULL-TERM UNCOMPLICATED DELIVERY   





(2) Encounter for  visit


Code(s): Z39.2 - ENCOUNTER FOR ROUTINE POSTPARTUM FOLLOW-UP   





(3) Anemia


Code(s): D64.9 - ANEMIA, UNSPECIFIED   


Qualifiers: 


   Anemia type: iron deficiency   Iron deficiency anemia type: inadequate 

dietary iron intake   Qualified Code(s): D50.8 - Other iron deficiency anemias 

  





Assessment/Plan


stable 


pt counselled for anemia 


discharge today

## 2018-12-10 NOTE — DS
Physical Examination


Vital Signs: 


 Vital Signs











Temperature  98.3 F   11/21/18 09:04


 


Pulse Rate  86   11/21/18 09:04


 


Respiratory Rate  16   11/21/18 09:04


 


Blood Pressure  136/75   11/21/18 09:04


 


O2 Sat by Pulse Oximetry (%)  100   11/19/18 19:45











Constitutional: Yes: Well Nourished, No Distress, Calm


Eyes: Yes: WNL, Conjunctiva Clear, EOM Intact


HENT: Yes: WNL, Atraumatic, Normocephalic


Neck: Yes: WNL, Supple, Trachea Midline


Cardiovascular: Yes: WNL, Regular Rate and Rhythm


Respiratory: Yes: WNL, Regular, CTA Bilaterally


Gastrointestinal: Yes: WNL, Normal Bowel Sounds


Musculoskeletal: Yes: WNL


Extremities: Yes: WNL


Edema: No


Integumentary: Yes: WNL


Neurological: Yes: WNL, Alert, Oriented


...Motor Strength: WNL


Psychiatric: Yes: WNL


Labs: 


 CBC, BMP





 11/20/18 10:55 





 11/19/18 17:30 











Discharge Summary


Procedures: Principal: Spontaneous Vaginal Delivery


Hospital Course: 





Patient presented in labor


She had a normal labor progression and delivered spontaneously


Her postpartum course was uncomplicated and she was discharged home on PPD#2


Condition: Stable





- Instructions


Diet, Activity, Other Instructions: 


Post Partum Instructions





DIET:


Continue good diet high in protein, calcium, and iron rich foods. Drink at 

least eight (8) glasses of water daily in addition to other fluids.


___   Regular diet











MEDICATIONS:


Continue prenatal vitamins and iron as previously directed. Motrin and Tylenol 

may be taken for minor discomfort. 





ACTIVITY:


Mild to moderate exercise may be started in two (2) weeks. Take frequent rest 

periods. Resume normal activity after six (6) week check up. 





WOUND CARE OF OPERATIVE SITE:


Continue use of perineal bottle until vaginal discharge stops. Keep area clean. 

Shower daily. Keep abdominal wound dry. Report any drainage or redness to 

physician. Tub baths, tampons and douches are not permitted for 6 weeks.





ct  Breast feeding  & or  Bottle feeding





BREAST CARE: (For those that are not breast feeding): If engorgement occurs:


Wear tight fitting bra.


Take Tylenol or Motrin for pain.


Apply cold packs (ice in bags to each breast )





FAMILY PLANNING:


There are many birth control alternatives to pursue and they should be 

discussed at your first office visit. You may resume sexual activity after your 

six (6) week check up. (Remember, breast feeding is not a contraceptive)





NEXT PHYSICIAN APPOINTMENT:


Be certain to call for a four (4) week appointment, unless otherwise directed.  





Call Clinic or got to Emergency Dept if you have any of the following:


   Heavy vaginal bleeding


   Painful urination


   Leg pain


   Unusual odor noted to vaginal bleeding


   High fever


   Red streaking noted on breast








Referrals: 


Luna Faith MD [Staff Physician] - 


Disposition: HOME





- Home Medications


Comprehensive Discharge Medication List: 


Ambulatory Orders





Prenatal Vitamins (Sjr) - 1 tab PO DAILY 09/21/18 


Acetaminophen [Tylenol .Regular Strength -] 650 mg PO Q3H PRN  tablet 11/20/18 


Benzocaine [Americaine 20% Spray -] 1 spray TP DAILY PRN  bottle 11/20/18 


Ferrous Sulfate [Feosol] 325 mg PO BID #60 tablet 11/20/18 


Ibuprofen [Motrin -] 200 mg PO Q4H PRN  tablet 11/20/18 


Prenatal Vitamins (Sjr) - 1 tab PO DAILY #30 tablet 11/20/18

## 2019-09-23 ENCOUNTER — HOSPITAL ENCOUNTER (EMERGENCY)
Dept: HOSPITAL 74 - JERFT | Age: 29
Discharge: HOME | End: 2019-09-23
Payer: COMMERCIAL

## 2019-09-23 VITALS — SYSTOLIC BLOOD PRESSURE: 119 MMHG | TEMPERATURE: 99 F | HEART RATE: 90 BPM | DIASTOLIC BLOOD PRESSURE: 89 MMHG

## 2019-09-23 VITALS — BODY MASS INDEX: 31.7 KG/M2
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
## 2019-09-23 DIAGNOSIS — S93.401A: Primary | ICD-10-CM

## 2019-09-23 NOTE — PDOC
History of Present Illness





- General


Chief Complaint: Pain


Stated Complaint: RT FOOT PAIN


Time Seen by Provider: 09/23/19 15:17


History Source: Patient


Exam Limitations: No Limitations





- History of Present Illness


Initial Comments: 





09/23/19 15:43


29 year old female with no significant medical history presents with pain to 

right foot since Saturday. Patient reports wearing high heels on Saturday and 

since then with pain and swelling. Denies twisting ankle, or fall. 


Occurred: reports: other (4 says ago)


Lower Extremity Pain Location: right: foot, ankle


Method of Injury: Yes: other (walked in high heels)


Modifying Factors: improves with: immobilization





Lower Ext. Injury Location





- Specific Injury Location


Hips: bilateral hip: no evidence of injury


Legs: bilateral: normal inspection


Knees: bilateral no evidence of injury


Ankle: right pain, right swelling


Foot: right foot pain, right foot swelling





Extremity Pain Location





- Extremity Pain Location


Extremity Pain Locations: right: foot, ankle





Past History





- Travel


Traveled outside of the country in the last 30 days: No


Close contact w/someone who was outside of country & ill: No





- Past Medical History


Allergies/Adverse Reactions: 


 Allergies











Allergy/AdvReac Type Severity Reaction Status Date / Time


 


No Known Drug Allergies Allergy   Verified 09/23/19 15:15











Home Medications: 


Ambulatory Orders





Prenatal Vitamins (Sjr) - 1 tab PO DAILY 09/21/18 


Acetaminophen [Tylenol .Regular Strength -] 650 mg PO Q3H PRN  tablet 11/20/18 


Benzocaine [Americaine 20% Spray -] 1 spray TP DAILY PRN  bottle 11/20/18 


Ferrous Sulfate [Feosol] 325 mg PO BID #60 tablet 11/20/18 


Ibuprofen [Motrin -] 200 mg PO Q4H PRN  tablet 11/20/18 


Prenatal Vitamins (Sjr) - 1 tab PO DAILY #30 tablet 11/20/18 


Ibuprofen 600 mg PO TID #20 tablet 09/23/19 








Anemia: No


Asthma: No


Cancer: No


Cardiac Disorders: No


CVA: No


COPD: No


CHF: No


Dementia: No


Diabetes: No


GI Disorders: No


 Disorders: No


HTN: No


Hypercholesterolemia: No


Liver Disease: No


Seizures: No


Thyroid Disease: No





- Surgical History


Cholecystectomy: Yes





- Immunization History


Td Vaccination: Yes


Immunization Up to Date: Yes





- Suicide/Smoking/Psychosocial Hx


Smoking Status: No


Smoking History: Never smoked


Have you smoked in the past 12 months: No


Number of Cigarettes Smoked Daily: 0


Information on smoking cessation initiated: No


Hx Alcohol Use: No


Drug/Substance Use Hx: No


Substance Use Type: None


Hx Substance Use Treatment: No





**Review of Systems





- Review of Systems


Able to Perform ROS?: Yes


Is the patient limited English proficient: No


Constitutional: No: Chills, Fever


HEENTM: No: Nose Congestion, Hearing Loss, Throat Pain, Throat Swelling


Respiratory: No: Orthopnea, Shortness of Breath, Stridor


Cardiac (ROS): No: Chest Pain, Lightheadedness


ABD/GI: No: Poor Appetite, Poor Fluid Intake


: No: Dysuria


Musculoskeletal: Yes: Joint Pain, Joint Swelling.  No: Back Pain, Gout, Neck 

Pain


Integumentary: No: Erythema


Neurological: No: Headache, Numbness


Psychiatric: No: Stressors


Endocrine: No: Increased Hunger





*Physical Exam





- Vital Signs


 Last Vital Signs











Temp Pulse Resp BP Pulse Ox


 


 99 F   90   18   119/89   100 


 


 09/23/19 15:13  09/23/19 15:13  09/23/19 15:13  09/23/19 15:13  09/23/19 15:13














- Physical Exam


General Appearance: Yes: Nourished, Appropriately Dressed


HEENT: positive: NINI, TMs Normal, Pharynx Normal


Neck: positive: Supple.  negative: Lymphadenopathy (R), Lymphadenopathy (L)


Respiratory/Chest: positive: Lungs Clear


Cardiovascular: positive: Regular Rhythm, Regular Rate


Musculoskeletal: positive: Normal Inspection


Extremity: positive: Swelling, Inflammation


Integumentary: negative: Erythema


Neurologic: positive: Fully Oriented, Alert





Medical Decision Making





- Medical Decision Making





09/23/19 15:55


29 year old female with no significant medical history presents with pain to 

right foot since Saturday. Patient reports wearing high heels on Saturday and 

since then with pain and swelling.





xray of right foot/ankle 


analgesia 


09/23/19 20:13


negative fracture


+ heel spur 





*DC/Admit/Observation/Transfer


Diagnosis at time of Disposition: 


Ankle sprain


Qualifiers:


 Encounter type: initial encounter Involved ligament of ankle: unspecified 

ligament Laterality: right Qualified Code(s): S93.401A - Sprain of unspecified 

ligament of right ankle, initial encounter








- Discharge Dispostion


Disposition: HOME


Condition at time of disposition: Good


Decision to Admit order: No





- Prescriptions


Prescriptions: 


Ibuprofen 600 mg PO TID #20 tablet





- Referrals


Referrals: 


Edwin Wilson MD [Primary Care Provider] - 





- Patient Instructions


Printed Discharge Instructions:  DI for Ankle Sprain


Additional Instructions: 


Please apply ice compress for 20 minutes 3 times daily 


Elevate leg at rest


May remove ace wrap for sleeping and showering 





- Post Discharge Activity


Forms/Work/School Notes:  Back to Work